# Patient Record
Sex: MALE | Race: OTHER | NOT HISPANIC OR LATINO | URBAN - METROPOLITAN AREA
[De-identification: names, ages, dates, MRNs, and addresses within clinical notes are randomized per-mention and may not be internally consistent; named-entity substitution may affect disease eponyms.]

---

## 2017-05-15 ENCOUNTER — EMERGENCY (EMERGENCY)
Facility: HOSPITAL | Age: 62
LOS: 1 days | Discharge: HOME | End: 2017-05-15
Attending: EMERGENCY MEDICINE | Admitting: EMERGENCY MEDICINE
Payer: COMMERCIAL

## 2017-05-15 VITALS
SYSTOLIC BLOOD PRESSURE: 139 MMHG | TEMPERATURE: 98 F | HEART RATE: 72 BPM | OXYGEN SATURATION: 99 % | DIASTOLIC BLOOD PRESSURE: 96 MMHG | RESPIRATION RATE: 20 BRPM

## 2017-05-15 VITALS
OXYGEN SATURATION: 97 % | HEART RATE: 84 BPM | RESPIRATION RATE: 16 BRPM | DIASTOLIC BLOOD PRESSURE: 88 MMHG | TEMPERATURE: 98 F | SYSTOLIC BLOOD PRESSURE: 164 MMHG

## 2017-05-15 LAB
ALBUMIN SERPL ELPH-MCNC: 4 G/DL — SIGNIFICANT CHANGE UP (ref 3.3–5)
ALP SERPL-CCNC: 83 U/L — SIGNIFICANT CHANGE UP (ref 40–120)
ALT FLD-CCNC: 24 U/L — SIGNIFICANT CHANGE UP (ref 4–41)
AST SERPL-CCNC: 22 U/L — SIGNIFICANT CHANGE UP (ref 4–40)
BASOPHILS # BLD AUTO: 0.02 K/UL — SIGNIFICANT CHANGE UP (ref 0–0.2)
BASOPHILS NFR BLD AUTO: 0.3 % — SIGNIFICANT CHANGE UP (ref 0–2)
BILIRUB SERPL-MCNC: 0.2 MG/DL — SIGNIFICANT CHANGE UP (ref 0.2–1.2)
BUN SERPL-MCNC: 13 MG/DL — SIGNIFICANT CHANGE UP (ref 7–23)
CALCIUM SERPL-MCNC: 9.3 MG/DL — SIGNIFICANT CHANGE UP (ref 8.4–10.5)
CHLORIDE SERPL-SCNC: 105 MMOL/L — SIGNIFICANT CHANGE UP (ref 98–107)
CO2 SERPL-SCNC: 25 MMOL/L — SIGNIFICANT CHANGE UP (ref 22–31)
CREAT SERPL-MCNC: 0.74 MG/DL — SIGNIFICANT CHANGE UP (ref 0.5–1.3)
EOSINOPHIL # BLD AUTO: 0.45 K/UL — SIGNIFICANT CHANGE UP (ref 0–0.5)
EOSINOPHIL NFR BLD AUTO: 6 % — SIGNIFICANT CHANGE UP (ref 0–6)
GLUCOSE SERPL-MCNC: 85 MG/DL — SIGNIFICANT CHANGE UP (ref 70–99)
HCT VFR BLD CALC: 38.7 % — LOW (ref 39–50)
HGB BLD-MCNC: 12.7 G/DL — LOW (ref 13–17)
IMM GRANULOCYTES NFR BLD AUTO: 0.3 % — SIGNIFICANT CHANGE UP (ref 0–1.5)
LYMPHOCYTES # BLD AUTO: 2.84 K/UL — SIGNIFICANT CHANGE UP (ref 1–3.3)
LYMPHOCYTES # BLD AUTO: 37.8 % — SIGNIFICANT CHANGE UP (ref 13–44)
MCHC RBC-ENTMCNC: 28.3 PG — SIGNIFICANT CHANGE UP (ref 27–34)
MCHC RBC-ENTMCNC: 32.8 % — SIGNIFICANT CHANGE UP (ref 32–36)
MCV RBC AUTO: 86.4 FL — SIGNIFICANT CHANGE UP (ref 80–100)
MONOCYTES # BLD AUTO: 0.96 K/UL — HIGH (ref 0–0.9)
MONOCYTES NFR BLD AUTO: 12.8 % — SIGNIFICANT CHANGE UP (ref 2–14)
NEUTROPHILS # BLD AUTO: 3.23 K/UL — SIGNIFICANT CHANGE UP (ref 1.8–7.4)
NEUTROPHILS NFR BLD AUTO: 42.8 % — LOW (ref 43–77)
PLATELET # BLD AUTO: 235 K/UL — SIGNIFICANT CHANGE UP (ref 150–400)
PMV BLD: 9.8 FL — SIGNIFICANT CHANGE UP (ref 7–13)
POTASSIUM SERPL-MCNC: 4 MMOL/L — SIGNIFICANT CHANGE UP (ref 3.5–5.3)
POTASSIUM SERPL-SCNC: 4 MMOL/L — SIGNIFICANT CHANGE UP (ref 3.5–5.3)
PROT SERPL-MCNC: 7.1 G/DL — SIGNIFICANT CHANGE UP (ref 6–8.3)
RBC # BLD: 4.48 M/UL — SIGNIFICANT CHANGE UP (ref 4.2–5.8)
RBC # FLD: 13.3 % — SIGNIFICANT CHANGE UP (ref 10.3–14.5)
SODIUM SERPL-SCNC: 142 MMOL/L — SIGNIFICANT CHANGE UP (ref 135–145)
WBC # BLD: 7.52 K/UL — SIGNIFICANT CHANGE UP (ref 3.8–10.5)
WBC # FLD AUTO: 7.52 K/UL — SIGNIFICANT CHANGE UP (ref 3.8–10.5)

## 2017-05-15 PROCEDURE — 99285 EMERGENCY DEPT VISIT HI MDM: CPT | Mod: 25

## 2017-05-15 PROCEDURE — 93970 EXTREMITY STUDY: CPT | Mod: 26

## 2017-05-15 PROCEDURE — 93010 ELECTROCARDIOGRAM REPORT: CPT | Mod: 76

## 2017-05-15 RX ORDER — IBUPROFEN 200 MG
1 TABLET ORAL
Qty: 40 | Refills: 0 | OUTPATIENT
Start: 2017-05-15 | End: 2017-05-25

## 2017-05-15 NOTE — ED PROVIDER NOTE - ATTENDING CONTRIBUTION TO CARE
diane: more than one month hx of pain and swelling to both knees/legs.   not responding to aleve.   no other joint pain or swelling.  hx chronic etoh, last drink 2 years ago.  pmh includes HTN  exam: full rom both knees. mild warmth bilateral. mild bilateral le swelling. not warm or red.   Xrays done as well as duplex. if negative, dc to opt f/u

## 2017-05-15 NOTE — ED PROVIDER NOTE - PLAN OF CARE
Take Ibuprofen 600mg every 6 hours as needed for mild-moderate pain.  Follow up with your primary care physician for repeat doppler of lower extremities in 1 week.  Follow up with the orthopedist for further evaluation - refer to the following list to make an appointment.  Return to the Emergency Department for any new, worsening or concerning symptoms.

## 2017-05-15 NOTE — ED ADULT NURSE NOTE - DISCHARGE TEACHING
Pt informed to follow-up with PCP, discharged education provided and pt verbalized understanding.  [id number],

## 2017-05-15 NOTE — ED PROVIDER NOTE - MEDICAL DECISION MAKING DETAILS
62 year old male with a PMHx of HTN pw increasing bilateral leg swelling and calf pain for the past 2 weeks.  Plan: bilateral duplex of lower extremities, CBC, CMP

## 2017-05-15 NOTE — ED PROVIDER NOTE - CARE PLAN
Principal Discharge DX:	Knee pain  Instructions for follow-up, activity and diet:	Take Ibuprofen 600mg every 6 hours as needed for mild-moderate pain.  Follow up with your primary care physician for repeat doppler of lower extremities in 1 week.  Follow up with the orthopedist for further evaluation - refer to the following list to make an appointment.  Return to the Emergency Department for any new, worsening or concerning symptoms.

## 2017-05-15 NOTE — ED ADULT TRIAGE NOTE - CHIEF COMPLAINT QUOTE
Pt with PMH of HTN, c/o 2 weeks of BLE swelling and pain. Denies travel. Denies chest pain, denies SOB. 2+ pitting edema noted

## 2017-05-15 NOTE — ED ADULT NURSE NOTE - OBJECTIVE STATEMENT
Received Pt alert and oriented x4, unlabored breathing, c/c bilateral lower extremity swelling, x2 weeks, +2, h/x of HTN, pt states that he has pain when ambulating, pt denies SOB and chest pain, labs sent, doppler completed by MD, pending results, pt provided plan of care and verbalized understanding, comfort measures provided, monitoring is ongoing.

## 2017-05-15 NOTE — ED PROVIDER NOTE - OBJECTIVE STATEMENT
62 year old male with a PMHx of HTN pw increasing bilateral leg swelling and calf pain for the past 2 weeks. The pain is 7/10 in severity, worse when walking, no relief with Aleve - last dose last night. Able to ambulate with no assistance. Admits to bilateral knee pain when ambulating. Denies n/v/f/c/CP, SOB, abdominal pain, hx DVT/PE/clots, hx of travel, trauma to affected extremity, tobacco use, exogenous hormone use, prolonged sitting, recent surgery, hx of fall. 62 year old male with a PMHx of HTN pw increasing bilateral leg swelling and calf pain for the past 2 weeks - onset 1 month ago. The pain is 7/10 in severity, worse when walking, no relief with Aleve - last dose last night. Able to ambulate with no assistance. Admits to bilateral knee pain when ambulating. Denies n/v/f/c/CP, SOB, abdominal pain, hx DVT/PE/clots, hx of travel, trauma to affected extremity, tobacco use, exogenous hormone use, prolonged sitting, recent surgery, hx of fall. 62 year old male with a PMHx of HTN pw increasing bilateral leg swelling and calf pain for the past 2 weeks - onset 1 month ago. The pain is 7/10 in severity, worse when walking, no relief with Aleve - last dose last night. Able to ambulate with no assistance. Admits to bilateral knee pain when ambulating. Saw his PCP in April when pain began - gave pain killers - no relief. Denies n/v/f/c/CP, SOB, abdominal pain, hx DVT/PE/clots, hx of travel, trauma to affected extremity, tobacco use, exogenous hormone use, prolonged sitting, recent surgery, hx of fall. Pt carlota speaking -  #[id number].

## 2019-09-04 ENCOUNTER — OFFICE (OUTPATIENT)
Dept: URBAN - METROPOLITAN AREA CLINIC 112 | Facility: CLINIC | Age: 64
Setting detail: OPHTHALMOLOGY
End: 2019-09-04
Payer: COMMERCIAL

## 2019-09-04 DIAGNOSIS — H25.13: ICD-10-CM

## 2019-09-04 DIAGNOSIS — H52.4: ICD-10-CM

## 2019-09-04 DIAGNOSIS — H40.003: ICD-10-CM

## 2019-09-04 DIAGNOSIS — E11.9: ICD-10-CM

## 2019-09-04 PROCEDURE — 92015 DETERMINE REFRACTIVE STATE: CPT | Performed by: OPHTHALMOLOGY

## 2019-09-04 PROCEDURE — 76514 ECHO EXAM OF EYE THICKNESS: CPT | Performed by: OPHTHALMOLOGY

## 2019-09-04 PROCEDURE — 92012 INTRM OPH EXAM EST PATIENT: CPT | Performed by: OPHTHALMOLOGY

## 2019-09-04 PROCEDURE — 92133 CPTRZD OPH DX IMG PST SGM ON: CPT | Performed by: OPHTHALMOLOGY

## 2019-09-04 ASSESSMENT — REFRACTION_AUTOREFRACTION
OD_SPHERE: +2.25
OS_AXIS: 107
OD_CYLINDER: -0.75
OD_AXIS: 075
OS_SPHERE: +2.25
OS_CYLINDER: -0.75

## 2019-09-04 ASSESSMENT — REFRACTION_CURRENTRX
OS_OVR_VA: 20/
OD_SPHERE: +2.00
OS_OVR_VA: 20/
OS_OVR_VA: 20/
OS_ADD: +0.75
OD_OVR_VA: 20/
OD_AXIS: 075
OS_CYLINDER: -1.00
OD_CYLINDER: -0.50
OD_OVR_VA: 20/
OD_OVR_VA: 20/
OS_AXIS: 114
OD_ADD: +1.25
OS_SPHERE: +2.25

## 2019-09-04 ASSESSMENT — REFRACTION_MANIFEST
OD_ADD: +1.75
OD_VA2: 20/
OD_VA2: 20/25
OU_VA: 20/
OS_VA2: 20/
OS_VA3: 20/
OS_VA3: 20/
OS_AXIS: 110
OS_VA1: 20/20
OD_VA3: 20/
OS_VA2: 20/25
OU_VA: 20/20
OD_VA1: 20/20
OD_AXIS: 070
OD_CYLINDER: -0.75
OD_VA3: 20/
OS_CYLINDER: -1.00
OD_SPHERE: +2.00
OS_ADD: +1.75
OS_VA1: 20/
OS_SPHERE: +2.25
OD_VA1: 20/

## 2019-09-04 ASSESSMENT — CONFRONTATIONAL VISUAL FIELD TEST (CVF)
OS_FINDINGS: FULL
OD_FINDINGS: FULL

## 2019-09-04 ASSESSMENT — KERATOMETRY
OD_K2POWER_DIOPTERS: 44.25
OS_K1POWER_DIOPTERS: 44.00
OD_K1POWER_DIOPTERS: 43.75
OD_AXISANGLE_DEGREES: 137
OS_K2POWER_DIOPTERS: 44.75
OS_AXISANGLE_DEGREES: 028

## 2019-09-04 ASSESSMENT — PACHYMETRY
OD_CT_UM: 523
OS_CT_CORRECTION: 1
OD_CT_CORRECTION: 1
OS_CT_UM: 537

## 2019-09-04 ASSESSMENT — AXIALLENGTH_DERIVED
OS_AL: 22.5849
OS_AL: 22.6296
OD_AL: 22.8025
OD_AL: 22.7118

## 2019-09-04 ASSESSMENT — VISUAL ACUITY
OD_BCVA: 20/20
OS_BCVA: 20/25

## 2019-09-04 ASSESSMENT — SPHEQUIV_DERIVED
OS_SPHEQUIV: 1.875
OS_SPHEQUIV: 1.75
OD_SPHEQUIV: 1.625
OD_SPHEQUIV: 1.875

## 2020-03-04 ENCOUNTER — OFFICE (OUTPATIENT)
Dept: URBAN - METROPOLITAN AREA CLINIC 112 | Facility: CLINIC | Age: 65
Setting detail: OPHTHALMOLOGY
End: 2020-03-04
Payer: COMMERCIAL

## 2020-03-04 DIAGNOSIS — H25.13: ICD-10-CM

## 2020-03-04 DIAGNOSIS — H40.003: ICD-10-CM

## 2020-03-04 PROCEDURE — 92250 FUNDUS PHOTOGRAPHY W/I&R: CPT | Performed by: OPHTHALMOLOGY

## 2020-03-04 PROCEDURE — 92012 INTRM OPH EXAM EST PATIENT: CPT | Performed by: OPHTHALMOLOGY

## 2020-03-04 PROCEDURE — 92083 EXTENDED VISUAL FIELD XM: CPT | Performed by: OPHTHALMOLOGY

## 2020-03-04 ASSESSMENT — REFRACTION_AUTOREFRACTION
OS_SPHERE: +2.25
OD_SPHERE: +2.25
OD_CYLINDER: -0.75
OS_CYLINDER: -0.75
OS_AXIS: 107
OD_AXIS: 075

## 2020-03-04 ASSESSMENT — REFRACTION_MANIFEST
OD_ADD: +1.75
OD_VA1: 20/20
OD_VA2: 20/25
OD_CYLINDER: -0.75
OS_VA1: 20/20
OD_SPHERE: +2.00
OS_SPHERE: +2.25
OS_VA2: 20/25
OU_VA: 20/20
OS_CYLINDER: -1.00
OS_ADD: +1.75
OS_AXIS: 110
OD_AXIS: 070

## 2020-03-04 ASSESSMENT — KERATOMETRY
OD_AXISANGLE_DEGREES: 137
OS_K2POWER_DIOPTERS: 44.75
OS_AXISANGLE_DEGREES: 028
OD_K2POWER_DIOPTERS: 44.25
OD_K1POWER_DIOPTERS: 43.75
OS_K1POWER_DIOPTERS: 44.00

## 2020-03-04 ASSESSMENT — REFRACTION_CURRENTRX
OD_SPHERE: +2.00
OD_AXIS: 075
OD_OVR_VA: 20/
OS_CYLINDER: -1.00
OS_SPHERE: +2.25
OS_OVR_VA: 20/
OS_ADD: +0.75
OD_ADD: +1.25
OS_AXIS: 114
OD_CYLINDER: -0.50

## 2020-03-04 ASSESSMENT — CONFRONTATIONAL VISUAL FIELD TEST (CVF)
OS_FINDINGS: FULL
OD_FINDINGS: FULL

## 2020-03-04 ASSESSMENT — AXIALLENGTH_DERIVED
OD_AL: 22.8025
OS_AL: 22.6296
OS_AL: 22.5849
OD_AL: 22.7118

## 2020-03-04 ASSESSMENT — SPHEQUIV_DERIVED
OS_SPHEQUIV: 1.875
OS_SPHEQUIV: 1.75
OD_SPHEQUIV: 1.875
OD_SPHEQUIV: 1.625

## 2020-03-04 ASSESSMENT — VISUAL ACUITY
OS_BCVA: 20/25
OD_BCVA: 20/25

## 2020-09-09 ENCOUNTER — OFFICE (OUTPATIENT)
Dept: URBAN - METROPOLITAN AREA CLINIC 112 | Facility: CLINIC | Age: 65
Setting detail: OPHTHALMOLOGY
End: 2020-09-09
Payer: COMMERCIAL

## 2020-09-09 DIAGNOSIS — H16.221: ICD-10-CM

## 2020-09-09 DIAGNOSIS — H25.13: ICD-10-CM

## 2020-09-09 DIAGNOSIS — E11.9: ICD-10-CM

## 2020-09-09 DIAGNOSIS — H40.003: ICD-10-CM

## 2020-09-09 DIAGNOSIS — H16.223: ICD-10-CM

## 2020-09-09 DIAGNOSIS — H16.222: ICD-10-CM

## 2020-09-09 PROCEDURE — 92014 COMPRE OPH EXAM EST PT 1/>: CPT | Performed by: OPHTHALMOLOGY

## 2020-09-09 PROCEDURE — 83861 MICROFLUID ANALY TEARS: CPT | Performed by: OPHTHALMOLOGY

## 2020-09-09 PROCEDURE — 92133 CPTRZD OPH DX IMG PST SGM ON: CPT | Performed by: OPHTHALMOLOGY

## 2020-09-09 ASSESSMENT — REFRACTION_CURRENTRX
OD_AXIS: 075
OD_CYLINDER: -0.50
OD_SPHERE: +2.00
OS_CYLINDER: -1.00
OD_ADD: +1.25
OS_SPHERE: +2.25
OS_OVR_VA: 20/
OD_OVR_VA: 20/
OS_AXIS: 114
OS_ADD: +0.75

## 2020-09-09 ASSESSMENT — REFRACTION_AUTOREFRACTION
OS_AXIS: 105
OD_SPHERE: +2.50
OD_CYLINDER: -0.75
OS_CYLINDER: -1.50
OS_SPHERE: +2.75
OD_AXIS: 075

## 2020-09-09 ASSESSMENT — SUPERFICIAL PUNCTATE KERATITIS (SPK)
OS_SPK: T
OD_SPK: T

## 2020-09-09 ASSESSMENT — PACHYMETRY
OS_CT_UM: 537
OD_CT_UM: 529
OS_CT_CORRECTION: 1
OD_CT_CORRECTION: 1

## 2020-09-09 ASSESSMENT — REFRACTION_MANIFEST
OS_ADD: +1.75
OD_VA1: 20/20
OD_ADD: +1.75
OU_VA: 20/20
OD_SPHERE: +2.00
OS_CYLINDER: -1.00
OS_SPHERE: +2.25
OS_AXIS: 110
OD_CYLINDER: -0.75
OD_AXIS: 070
OD_VA2: 20/25
OS_VA2: 20/25
OS_VA1: 20/20

## 2020-09-09 ASSESSMENT — KERATOMETRY
OS_K1POWER_DIOPTERS: 44.00
OS_K2POWER_DIOPTERS: 44.75
OS_AXISANGLE_DEGREES: 028
OD_K1POWER_DIOPTERS: 43.75
OD_K2POWER_DIOPTERS: 44.25
OD_AXISANGLE_DEGREES: 137

## 2020-09-09 ASSESSMENT — VISUAL ACUITY
OS_BCVA: 20/25-1
OD_BCVA: 20/25+1

## 2020-09-09 ASSESSMENT — AXIALLENGTH_DERIVED
OD_AL: 22.6219
OS_AL: 22.5404
OS_AL: 22.6296
OD_AL: 22.8025

## 2020-09-09 ASSESSMENT — SPHEQUIV_DERIVED
OD_SPHEQUIV: 1.625
OS_SPHEQUIV: 1.75
OS_SPHEQUIV: 2
OD_SPHEQUIV: 2.125

## 2020-09-09 ASSESSMENT — TONOMETRY
OD_IOP_MMHG: 10
OS_IOP_MMHG: 10

## 2020-09-09 ASSESSMENT — CONFRONTATIONAL VISUAL FIELD TEST (CVF)
OD_FINDINGS: FULL
OS_FINDINGS: FULL

## 2021-03-31 ENCOUNTER — OFFICE (OUTPATIENT)
Dept: URBAN - METROPOLITAN AREA CLINIC 112 | Facility: CLINIC | Age: 66
Setting detail: OPHTHALMOLOGY
End: 2021-03-31
Payer: COMMERCIAL

## 2021-03-31 DIAGNOSIS — E11.9: ICD-10-CM

## 2021-03-31 DIAGNOSIS — H25.13: ICD-10-CM

## 2021-03-31 DIAGNOSIS — H40.003: ICD-10-CM

## 2021-03-31 DIAGNOSIS — H16.223: ICD-10-CM

## 2021-03-31 PROCEDURE — 92083 EXTENDED VISUAL FIELD XM: CPT | Performed by: OPHTHALMOLOGY

## 2021-03-31 PROCEDURE — 92014 COMPRE OPH EXAM EST PT 1/>: CPT | Performed by: OPHTHALMOLOGY

## 2021-03-31 PROCEDURE — 92250 FUNDUS PHOTOGRAPHY W/I&R: CPT | Performed by: OPHTHALMOLOGY

## 2021-03-31 ASSESSMENT — REFRACTION_AUTOREFRACTION
OD_SPHERE: +2.75
OS_SPHERE: +2.75
OS_AXIS: 095
OD_CYLINDER: -0.75
OD_AXIS: 076
OS_CYLINDER: -0.75

## 2021-03-31 ASSESSMENT — REFRACTION_MANIFEST
OS_VA2: 20/25
OS_VA1: 20/20
OU_VA: 20/20
OS_AXIS: 110
OS_ADD: +1.75
OD_CYLINDER: -0.75
OS_CYLINDER: -1.00
OD_VA2: 20/25
OD_SPHERE: +2.00
OD_VA1: 20/20
OD_ADD: +1.75
OS_SPHERE: +2.25
OD_AXIS: 070

## 2021-03-31 ASSESSMENT — KERATOMETRY
OS_K1POWER_DIOPTERS: 44.25
OS_AXISANGLE_DEGREES: 048
OD_K1POWER_DIOPTERS: 44.00
OD_AXISANGLE_DEGREES: 149
OD_K2POWER_DIOPTERS: 44.25
OS_K2POWER_DIOPTERS: 44.50

## 2021-03-31 ASSESSMENT — SPHEQUIV_DERIVED
OD_SPHEQUIV: 2.375
OS_SPHEQUIV: 2.375
OD_SPHEQUIV: 1.625
OS_SPHEQUIV: 1.75

## 2021-03-31 ASSESSMENT — REFRACTION_CURRENTRX
OD_SPHERE: +2.00
OD_AXIS: 075
OD_CYLINDER: -0.50
OS_SPHERE: +2.25
OD_ADD: +1.25
OS_OVR_VA: 20/
OS_ADD: +0.75
OS_AXIS: 114
OS_CYLINDER: -1.00
OD_OVR_VA: 20/

## 2021-03-31 ASSESSMENT — VISUAL ACUITY
OD_BCVA: 20/25-1
OS_BCVA: 20/60+

## 2021-03-31 ASSESSMENT — AXIALLENGTH_DERIVED
OS_AL: 22.4078
OD_AL: 22.4909
OS_AL: 22.6296
OD_AL: 22.7597

## 2021-03-31 ASSESSMENT — PACHYMETRY
OD_CT_CORRECTION: 1
OS_CT_CORRECTION: 1
OS_CT_UM: 537
OD_CT_UM: 529

## 2021-03-31 ASSESSMENT — TONOMETRY
OD_IOP_MMHG: 17
OS_IOP_MMHG: 17

## 2021-03-31 ASSESSMENT — SUPERFICIAL PUNCTATE KERATITIS (SPK)
OD_SPK: T
OS_SPK: T

## 2021-03-31 ASSESSMENT — CONFRONTATIONAL VISUAL FIELD TEST (CVF)
OD_FINDINGS: FULL
OS_FINDINGS: FULL

## 2021-12-08 ENCOUNTER — OFFICE (OUTPATIENT)
Dept: URBAN - METROPOLITAN AREA CLINIC 112 | Facility: CLINIC | Age: 66
Setting detail: OPHTHALMOLOGY
End: 2021-12-08
Payer: MEDICARE

## 2021-12-08 DIAGNOSIS — H25.13: ICD-10-CM

## 2021-12-08 DIAGNOSIS — H16.223: ICD-10-CM

## 2021-12-08 DIAGNOSIS — H40.003: ICD-10-CM

## 2021-12-08 DIAGNOSIS — H10.45: ICD-10-CM

## 2021-12-08 PROCEDURE — 92012 INTRM OPH EXAM EST PATIENT: CPT | Performed by: OPHTHALMOLOGY

## 2021-12-08 PROCEDURE — 92133 CPTRZD OPH DX IMG PST SGM ON: CPT | Performed by: OPHTHALMOLOGY

## 2021-12-08 ASSESSMENT — REFRACTION_MANIFEST
OD_AXIS: 070
OD_SPHERE: +2.00
OD_CYLINDER: -0.75
OS_VA2: 20/25
OS_VA1: 20/20
OU_VA: 20/20
OD_ADD: +1.75
OS_CYLINDER: -1.00
OS_ADD: +1.75
OD_VA2: 20/25
OS_SPHERE: +2.25
OD_VA1: 20/20
OS_AXIS: 110

## 2021-12-08 ASSESSMENT — TONOMETRY
OD_IOP_MMHG: 17
OS_IOP_MMHG: 19

## 2021-12-08 ASSESSMENT — REFRACTION_CURRENTRX
OD_ADD: +1.25
OD_CYLINDER: -0.50
OS_OVR_VA: 20/
OD_SPHERE: +2.00
OD_AXIS: 075
OS_AXIS: 114
OS_SPHERE: +2.25
OS_CYLINDER: -1.00
OD_OVR_VA: 20/
OS_ADD: +0.75

## 2021-12-08 ASSESSMENT — KERATOMETRY
OS_K2POWER_DIOPTERS: 44.50
OS_AXISANGLE_DEGREES: 048
OD_K1POWER_DIOPTERS: 44.00
OD_AXISANGLE_DEGREES: 149
OD_K2POWER_DIOPTERS: 44.25
OS_K1POWER_DIOPTERS: 44.25

## 2021-12-08 ASSESSMENT — SUPERFICIAL PUNCTATE KERATITIS (SPK)
OD_SPK: T
OS_SPK: T

## 2021-12-08 ASSESSMENT — REFRACTION_AUTOREFRACTION
OS_SPHERE: +3.00
OS_AXIS: 094
OD_AXIS: 076
OD_SPHERE: +2.75
OD_CYLINDER: -0.75
OS_CYLINDER: -1.25

## 2021-12-08 ASSESSMENT — AXIALLENGTH_DERIVED
OD_AL: 22.4909
OS_AL: 22.6296
OD_AL: 22.7597
OS_AL: 22.4078

## 2021-12-08 ASSESSMENT — CONFRONTATIONAL VISUAL FIELD TEST (CVF)
OS_FINDINGS: FULL
OD_FINDINGS: FULL

## 2021-12-08 ASSESSMENT — SPHEQUIV_DERIVED
OD_SPHEQUIV: 2.375
OD_SPHEQUIV: 1.625
OS_SPHEQUIV: 2.375
OS_SPHEQUIV: 1.75

## 2021-12-08 ASSESSMENT — PACHYMETRY
OD_CT_CORRECTION: 1
OS_CT_UM: 537
OD_CT_UM: 529
OS_CT_CORRECTION: 1

## 2021-12-08 ASSESSMENT — VISUAL ACUITY
OS_BCVA: 20/40+
OD_BCVA: 20/25+

## 2022-04-13 ENCOUNTER — OFFICE (OUTPATIENT)
Dept: URBAN - METROPOLITAN AREA CLINIC 112 | Facility: CLINIC | Age: 67
Setting detail: OPHTHALMOLOGY
End: 2022-04-13
Payer: MEDICARE

## 2022-04-13 DIAGNOSIS — H40.003: ICD-10-CM

## 2022-04-13 DIAGNOSIS — H16.223: ICD-10-CM

## 2022-04-13 DIAGNOSIS — H25.13: ICD-10-CM

## 2022-04-13 PROBLEM — H52.4 PRESBYOPIA ; BOTH EYES: Status: ACTIVE | Noted: 2018-07-19

## 2022-04-13 PROBLEM — H16.222 DRY EYE SYNDROME K SICCA; RIGHT EYE, LEFT EYE, BOTH EYES: Status: ACTIVE | Noted: 2020-09-09

## 2022-04-13 PROBLEM — H16.221 DRY EYE SYNDROME K SICCA; RIGHT EYE, LEFT EYE, BOTH EYES: Status: ACTIVE | Noted: 2020-09-09

## 2022-04-13 PROBLEM — H52.03 HYPEROPIA ; BOTH EYES: Status: ACTIVE | Noted: 2018-07-19

## 2022-04-13 PROBLEM — E11.9 DIABETES TYPE II WITHOUT NPDR: Status: ACTIVE | Noted: 2018-07-19

## 2022-04-13 PROBLEM — H10.45 ALLERGIC CONJUNCTIVITIS: Status: ACTIVE | Noted: 2021-12-08

## 2022-04-13 PROCEDURE — 92014 COMPRE OPH EXAM EST PT 1/>: CPT | Performed by: OPHTHALMOLOGY

## 2022-04-13 ASSESSMENT — KERATOMETRY
OD_K2POWER_DIOPTERS: 44.75
OD_K1POWER_DIOPTERS: 44.25
OD_CYLAXISANGLE_DEGREES: 109
OD_K1POWER_DIOPTERS: 44.25
OS_AXISANGLE_DEGREES: 104
OS_K1K2_AVERAGE: 44.25
OD_AXISANGLE2_DEGREES: 109
OS_K2POWER_DIOPTERS: 44.50
OS_K1POWER_DIOPTERS: 44.00
OS_CYLPOWER_DEGREES: 0.5
OD_CYLPOWER_DEGREES: 0.5
OD_AXISANGLE_DEGREES: 19
OD_AXISANGLE_DEGREES: 109
OD_K1K2_AVERAGE: 44.5
OS_AXISANGLE_DEGREES: 014
OS_K2POWER_DIOPTERS: 44.50
OS_K1POWER_DIOPTERS: 44.00
OS_AXISANGLE2_DEGREES: 014
OS_CYLAXISANGLE_DEGREES: 014
OD_K2POWER_DIOPTERS: 44.75

## 2022-04-13 ASSESSMENT — AXIALLENGTH_DERIVED
OS_AL: 22.4493
OD_AL: 22.6322
OS_AL: 22.6719
OD_AL: 22.4543

## 2022-04-13 ASSESSMENT — REFRACTION_MANIFEST
OU_VA: 20/20
OS_VA2: 20/25
OS_SPHERE: +2.25
OD_VA1: 20/20
OS_ADD: +1.75
OD_VA2: 20/25
OD_ADD: +1.75
OS_AXIS: 110
OD_AXIS: 070
OS_VA1: 20/20
OD_SPHERE: +2.00
OS_CYLINDER: -1.00
OD_CYLINDER: -0.75

## 2022-04-13 ASSESSMENT — REFRACTION_CURRENTRX
OS_ADD: +0.75
OD_OVR_VA: 20/
OD_SPHERE: +2.00
OD_ADD: +1.25
OS_AXIS: 114
OD_AXIS: 075
OS_CYLINDER: -1.00
OS_OVR_VA: 20/
OS_SPHERE: +2.25
OD_CYLINDER: -0.50

## 2022-04-13 ASSESSMENT — SUPERFICIAL PUNCTATE KERATITIS (SPK)
OS_SPK: T
OD_SPK: T

## 2022-04-13 ASSESSMENT — SPHEQUIV_DERIVED
OS_SPHEQUIV: 2.375
OD_SPHEQUIV: 1.625
OS_SPHEQUIV: 1.75
OD_SPHEQUIV: 2.125

## 2022-04-13 ASSESSMENT — TONOMETRY
OD_IOP_MMHG: 16
OS_IOP_MMHG: 19

## 2022-04-13 ASSESSMENT — REFRACTION_AUTOREFRACTION
OS_SPHERE: +3.00
OS_AXIS: 092
OS_CYLINDER: -1.25
OD_SPHERE: +2.50
OD_CYLINDER: -0.75
OD_AXIS: 079

## 2022-04-13 ASSESSMENT — PACHYMETRY
OD_CT_CORRECTION: 1
OS_CT_CORRECTION: 1
OD_CT_UM: 529
OS_CT_UM: 537

## 2022-04-13 ASSESSMENT — VISUAL ACUITY
OS_BCVA: 20/25-1
OD_BCVA: 20/25

## 2022-04-13 ASSESSMENT — CONFRONTATIONAL VISUAL FIELD TEST (CVF)
OS_FINDINGS: FULL
OD_FINDINGS: FULL